# Patient Record
Sex: MALE
[De-identification: names, ages, dates, MRNs, and addresses within clinical notes are randomized per-mention and may not be internally consistent; named-entity substitution may affect disease eponyms.]

---

## 2021-02-17 PROBLEM — Z00.00 ENCOUNTER FOR PREVENTIVE HEALTH EXAMINATION: Status: ACTIVE | Noted: 2021-02-17

## 2021-02-24 ENCOUNTER — APPOINTMENT (OUTPATIENT)
Dept: ENDOCRINOLOGY | Facility: CLINIC | Age: 46
End: 2021-02-24
Payer: COMMERCIAL

## 2021-02-24 VITALS
WEIGHT: 151 LBS | SYSTOLIC BLOOD PRESSURE: 132 MMHG | BODY MASS INDEX: 24.27 KG/M2 | OXYGEN SATURATION: 97 % | TEMPERATURE: 98.1 F | HEART RATE: 103 BPM | RESPIRATION RATE: 18 BRPM | HEIGHT: 66 IN | DIASTOLIC BLOOD PRESSURE: 89 MMHG

## 2021-02-24 LAB
GLUCOSE BLDC GLUCOMTR-MCNC: 347
HBA1C MFR BLD HPLC: 13.1

## 2021-02-24 PROCEDURE — 99072 ADDL SUPL MATRL&STAF TM PHE: CPT

## 2021-02-24 PROCEDURE — 99204 OFFICE O/P NEW MOD 45 MIN: CPT | Mod: 25,GC

## 2021-02-24 PROCEDURE — 83036 HEMOGLOBIN GLYCOSYLATED A1C: CPT | Mod: QW

## 2021-02-24 PROCEDURE — 82962 GLUCOSE BLOOD TEST: CPT

## 2021-02-24 RX ORDER — INSULIN LISPRO 100 [IU]/ML
100 INJECTION, SOLUTION INTRAVENOUS; SUBCUTANEOUS
Qty: 5 | Refills: 5 | Status: ACTIVE | COMMUNITY
Start: 2021-02-24 | End: 1900-01-01

## 2021-02-24 RX ORDER — LINAGLIPTIN AND METFORMIN HYDROCHLORIDE 2.5; 1 MG/1; MG/1
2.5-1 TABLET, FILM COATED ORAL
Qty: 60 | Refills: 3 | Status: ACTIVE | COMMUNITY
Start: 2021-02-24 | End: 1900-01-01

## 2021-04-06 ENCOUNTER — APPOINTMENT (OUTPATIENT)
Dept: ENDOCRINOLOGY | Facility: CLINIC | Age: 46
End: 2021-04-06

## 2021-04-14 ENCOUNTER — APPOINTMENT (OUTPATIENT)
Dept: ENDOCRINOLOGY | Facility: CLINIC | Age: 46
End: 2021-04-14
Payer: COMMERCIAL

## 2021-04-14 VITALS
HEART RATE: 112 BPM | BODY MASS INDEX: 26.36 KG/M2 | DIASTOLIC BLOOD PRESSURE: 97 MMHG | SYSTOLIC BLOOD PRESSURE: 151 MMHG | HEIGHT: 66 IN | WEIGHT: 164 LBS

## 2021-04-14 DIAGNOSIS — E11.9 TYPE 2 DIABETES MELLITUS W/OUT COMPLICATIONS: ICD-10-CM

## 2021-04-14 DIAGNOSIS — Z83.3 FAMILY HISTORY OF DIABETES MELLITUS: ICD-10-CM

## 2021-04-14 DIAGNOSIS — E78.5 HYPERLIPIDEMIA, UNSPECIFIED: ICD-10-CM

## 2021-04-14 DIAGNOSIS — I10 ESSENTIAL (PRIMARY) HYPERTENSION: ICD-10-CM

## 2021-04-14 DIAGNOSIS — Z78.9 OTHER SPECIFIED HEALTH STATUS: ICD-10-CM

## 2021-04-14 LAB
GLUCOSE BLDC GLUCOMTR-MCNC: 200
HBA1C MFR BLD HPLC: 9.9

## 2021-04-14 PROCEDURE — 83036 HEMOGLOBIN GLYCOSYLATED A1C: CPT | Mod: QW

## 2021-04-14 PROCEDURE — 82962 GLUCOSE BLOOD TEST: CPT

## 2021-04-14 PROCEDURE — 99214 OFFICE O/P EST MOD 30 MIN: CPT | Mod: 25,GC

## 2021-04-14 PROCEDURE — 99072 ADDL SUPL MATRL&STAF TM PHE: CPT

## 2021-04-14 RX ORDER — INSULIN GLARGINE 100 [IU]/ML
100 INJECTION, SOLUTION SUBCUTANEOUS
Qty: 3 | Refills: 3 | Status: ACTIVE | COMMUNITY
Start: 2021-02-24 | End: 1900-01-01

## 2021-04-14 RX ORDER — INSULIN LISPRO 100 [IU]/ML
100 INJECTION, SOLUTION INTRAVENOUS; SUBCUTANEOUS
Qty: 3 | Refills: 4 | Status: ACTIVE | COMMUNITY
Start: 2021-03-03 | End: 1900-01-01

## 2021-04-14 NOTE — PHYSICAL EXAM
[Alert] : alert [No Acute Distress] : no acute distress [No Neck Mass] : no neck mass was observed [No Thyroid Nodules] : no palpable thyroid nodules [No Respiratory Distress] : no respiratory distress [No Accessory Muscle Use] : no accessory muscle use [Normal S1, S2] : normal S1 and S2 [Normal Rate] : heart rate was normal [Not Tender] : non-tender [Soft] : abdomen soft [Normal Reflexes] : deep tendon reflexes were 2+ and symmetric [No Tremors] : no tremors [Oriented x3] : oriented to person, place, and time [Normal Affect] : the affect was normal [Normal Gait] : normal gait [No Joint Swelling] : no joint swelling seen [No Motor Deficits] : the motor exam was normal [Normal Mood] : the mood was normal

## 2021-04-14 NOTE — ASSESSMENT
[FreeTextEntry1] : A/P: Patient is 45 y.o. male with PMHx of DM, He is here for initial visit. \par \par 1. DM unclear type\par will check C-peptide, Islet cell antibodies, REINA Ab \par A1c today: 13.1%\par He is currently taking 23 U Lantus at bedtime, lispro 10-12 U TID premeals, jentadueto (2.5/1000) BID.\par will increase lantus to 30 U at bedtime and lispro 15 U TID premelas, c/w jentadueto (2.5/1000) BID\par will check lipid panel today, he is on statin 20 mg \par Eye exam UTD\par Urine microalbumin UTD \par \par RTC in 6 weeks. \par \par case seen and discussed with Dr. Mcrae

## 2021-04-14 NOTE — ADDENDUM
[FreeTextEntry1] : Attending:\par Pt seen with Dr. BUNN\par Glycemic control was poor on oral agents prior to his recent COVID infection, though it is difficult to tell to what degree dietary factors contributed to this.  He was apparently quite hyperglycemic (> 400) while in the hospital and on steroids, and was discharged on insulin therapy.  He was also discharged on a Prednisone taper, but he has now been off Prednisone for the past week.  Glucoses at home have been improving but are still > 200 despite good dietary compliance, and will therefore increase his insulin as noted above.\par Of significant concern is the need to determine whether he has type I or 2 DM.  His family history is positive for both.  Given his non-obese status, it would probably be unusual for him to have developed typical type 2 disease at this age.  He gives a history of having been tested for antibodies a few years ago, and he may have been positive.  My suspicion is that he is KASSANDRA, and we will repeat REINA antibodies and C-peptide today.  The implications of this were discussed with the pt and his wife in detail.  \par Also of note:\par --He mentioned that he was told that his CT scan in the hospital showed coronary artery "plaque"--?? calcification.  Under these circumstances, and given his DM and family history for markedly premature CAD in his father, will target his LDL to < 70 mg%.  (Lipids to be repeated today).  He may also warrant a measurement of Lp(a).\par --He also has a history of what appears to be obstructive uropathy with an enlarged bladder.  He has seen a urologist and cystoscopy was recommended, but he was afraid to go through with the procedure.  He describes urinary frequency with small volumes.  Strongly encouraged him to proceed with evaluation, warning him that he may risk developing an atonic bladder.\par \par REBECA Mcrae MD

## 2021-04-14 NOTE — HISTORY OF PRESENT ILLNESS
[FreeTextEntry1] : Patient is 45 y.o. male with PMHx of DM, He is here for initial visit. \par He reports his last A1c was 11% last year and he was diagnosed with COVID 1 month ago  and hospitalized for COVID on steroid and discharged home on steroid, he had hyperglycemia. He stopped taking prednisone 10mg last week. He reports polyuria and poly dispira. He is currently taking 23 U Lantus at bedtime, lispro 10-12 U TID premeals, jentadueto (2.1000) BID. no weight change. \par \par DM hx: \par He was diagnosed with DM at age 38 via regular blood work. \par He is currently taking 23 U Lantus at bedtime, lispro 10-12 U TID premeals, jentadueto (2.1000) BID\par family hx of type 1 in mother and type 2 in his father \par Diet: seen dietitian, cut down on carb. Breakfast: Lunch: Dinner: \par He checks FS x3: fastins. prelunch: 260s.at bedtime: 300s. \par \par complications: denies retinopathy or neuropathy \par He reports he had Urien microalbumin 1 month ago , no proteinuria and eye exam 2 months ago.

## 2021-04-14 NOTE — PHYSICAL EXAM
[Alert] : alert [No Acute Distress] : no acute distress [No Proptosis] : no proptosis [No Lid Lag] : no lid lag [No LAD] : no lymphadenopathy [Thyroid Not Enlarged] : the thyroid was not enlarged [No Respiratory Distress] : no respiratory distress [Clear to Auscultation] : lungs were clear to auscultation bilaterally [Normal Rate] : heart rate was normal [Not Tender] : non-tender [Soft] : abdomen soft [Normal Reflexes] : deep tendon reflexes were 2+ and symmetric [No Tremors] : no tremors [Oriented x3] : oriented to person, place, and time [Normal Affect] : the affect was normal [Normal] : normal [2+] : 2+ in the dorsalis pedis [Full ROM] : with limited range of motion [Swelling] : not swollen [Tenderness] : not tender [Erythema] : not erythematous [Vibration Dec.] : normal vibratory sensation at the level of the toes [Position Sense Dec.] : normal position sense at the level of the toes [Diminished Throughout Both Feet] : normal tactile sensation with monofilament testing throughout both feet

## 2021-04-14 NOTE — REVIEW OF SYSTEMS
[Fatigue] : no fatigue [Fever] : no fever [Chills] : no chills [Visual Field Defect] : no visual field defect [Chest Pain] : no chest pain [Slow Heart Rate] : heart rate is not slow [Palpitations] : no palpitations [Shortness Of Breath] : no shortness of breath [Cough] : no cough [Nausea] : no nausea [Vomiting] : no vomiting [Headaches] : no headaches [Dizziness] : no dizziness [Confusion] : no confusion [Tremors] : no tremors [Polydipsia] : no polydipsia [Cold Intolerance] : no cold intolerance [Heat Intolerance] : no heat intolerance

## 2021-04-15 NOTE — END OF VISIT
[] : Fellow [Time Spent: ___ minutes] : I have spent [unfilled] minutes of time on the encounter. [FreeTextEntry3] : Agreess with assessment and plan above. Patient has made some changes to diet and has improved A1c levels. We discussed that diet remains high in carbs and especially snacks.  Educated that target A1c is about 7.5%.  Recommend increasing lantus and taking consistent pre-meal insulin if he is going to eat.  Patient to check fingersticks and log food diary. He follows with nutrition outside of [facility].  He requires monofilament testing at next visit as it was not documented on this visit.  States he is up to date with dilated eye exam-ask patient to have report sent. If not at goal, would recommend changing his jentadueto to a medicine that will provide more weight loss coverage like SGLT2 inhibitor.  Can be discussed at next visit based on A1c

## 2021-04-15 NOTE — HISTORY OF PRESENT ILLNESS
[FreeTextEntry1] : Patient is 45 y.o. male with type2 DM, HTN and HLD here for follow up . \par \par He was diagnosed with DM at age 38 via regular blood work. He was diagnosed with COVID in 2021 and hospitalized for COVID with steroid induced hyperglycemia. He is currently taking 20 U Lantus at bedtime, Humolog 13 U with certain meals. He admits to not always taking pre-meal insulin, takes jentadueto (2.1000) daily. \par He reports gained 20 Ib  in 2 months. \par He denies polyuria or polydipsia. \par Diet: seen dietitian, cut down on carb. \par Breakfast: hash brown or egg roll. \par Lunch: salad. \par Dinner: boiled egg. He snacks with cake and cookies after dinner. Denies juice or soda. \par He checks FS x3: fastins. prelunch: 230-260s.at bedtime: 250s. \par Family hx of type 1 in mother and type 2 in his father. \par \par Complications: denies retinopathy or neuropathy \par Dilated Eye exam in 2021 as per pt no DM complication. \par

## 2021-04-15 NOTE — REVIEW OF SYSTEMS
[Recent Weight Gain (___ Lbs)] : recent weight gain: [unfilled] lbs [Fatigue] : no fatigue [Decreased Appetite] : appetite not decreased [Visual Field Defect] : no visual field defect [Dry Eyes] : no dryness [Dysphagia] : no dysphagia [Dysphonia] : no dysphonia [Chest Pain] : no chest pain [Palpitations] : no palpitations [Shortness Of Breath] : no shortness of breath [Cough] : no cough [Nausea] : no nausea [Constipation] : no constipation [Vomiting] : no vomiting [Diarrhea] : no diarrhea [Polyuria] : no polyuria [Dysuria] : no dysuria [Headaches] : no headaches [Dizziness] : no dizziness [Tremors] : no tremors [Polydipsia] : no polydipsia [Cold Intolerance] : no cold intolerance [Heat Intolerance] : no heat intolerance [Easy Bleeding] : no ~M tendency for easy bleeding

## 2021-04-15 NOTE — ASSESSMENT
[FreeTextEntry1] : \par A/P: Patient is 45 y.o. male with type 2 DM, HTN and HLD here for follow up. \par \par 1. Uncontrolled type 2 DM \par -A1c: 9.9% improving  from 13.1%\par - C-peptide: 1.9, Islet cell antibodies, and REINA Ab negative\par -He is currently taking 20 U Lantus at bedtime, Humolog 13 U not taking with every meals, jentadueto (2.5/1000) daily.\par -will increase lantus to 30 U at bedtime and lispro 8 U  TID premelas, c/w jentadueto (2.5/1000) BID. \par -Discussed starting jardiance and trulicity, he wants to think about for next visit \par -Eye exam UTD\par -Urine micro albumin \par -seen by dietician \par -Pt educated about lifestyle modification, diet and exercise \par \par 2. HTN uncontrolled\par -Ran out of lisinopril for few days  \par -Will refill today \par -Pt educated about lifestyle modification, diet and exercise \par \par 3. HLD\par -On Lipitor 20 mg daily\par -Will check lipid panel \par -LDL goal<70 \par \par RTC in 6 weeks. \par \par case seen and discussed with Dr. Chan. \par

## 2021-07-14 ENCOUNTER — APPOINTMENT (OUTPATIENT)
Dept: ENDOCRINOLOGY | Facility: CLINIC | Age: 46
End: 2021-07-14